# Patient Record
Sex: MALE | Race: WHITE | NOT HISPANIC OR LATINO | Employment: UNEMPLOYED | URBAN - METROPOLITAN AREA
[De-identification: names, ages, dates, MRNs, and addresses within clinical notes are randomized per-mention and may not be internally consistent; named-entity substitution may affect disease eponyms.]

---

## 2023-03-28 DIAGNOSIS — M25.512 ACUTE PAIN OF LEFT SHOULDER: Primary | ICD-10-CM

## 2023-03-30 ENCOUNTER — TELEPHONE (OUTPATIENT)
Dept: ORTHOPEDICS | Facility: CLINIC | Age: 38
End: 2023-03-30
Payer: MEDICAID

## 2023-03-30 ENCOUNTER — OFFICE VISIT (OUTPATIENT)
Dept: ORTHOPEDICS | Facility: CLINIC | Age: 38
End: 2023-03-30
Payer: MEDICAID

## 2023-03-30 ENCOUNTER — HOSPITAL ENCOUNTER (OUTPATIENT)
Dept: RADIOLOGY | Facility: HOSPITAL | Age: 38
Discharge: HOME | End: 2023-03-30
Attending: ORTHOPAEDIC SURGERY
Payer: MEDICAID

## 2023-03-30 VITALS — WEIGHT: 202 LBS | HEIGHT: 70 IN | BODY MASS INDEX: 28.92 KG/M2

## 2023-03-30 DIAGNOSIS — M25.512 ACUTE PAIN OF LEFT SHOULDER: ICD-10-CM

## 2023-03-30 DIAGNOSIS — G54.0 THORACIC OUTLET SYNDROME: Primary | ICD-10-CM

## 2023-03-30 PROCEDURE — 1159F PR MEDICATION LIST DOCUMENTED IN MEDICAL RECORD: ICD-10-PCS | Mod: CPTII,,, | Performed by: ORTHOPAEDIC SURGERY

## 2023-03-30 PROCEDURE — 73030 XR SHOULDER COMPLETE 2 OR MORE VIEWS LEFT: ICD-10-PCS | Mod: 26,LT,, | Performed by: RADIOLOGY

## 2023-03-30 PROCEDURE — 99203 OFFICE O/P NEW LOW 30 MIN: CPT | Mod: S$PBB,,, | Performed by: ORTHOPAEDIC SURGERY

## 2023-03-30 PROCEDURE — 73030 X-RAY EXAM OF SHOULDER: CPT | Mod: 26,LT,, | Performed by: RADIOLOGY

## 2023-03-30 PROCEDURE — 73030 X-RAY EXAM OF SHOULDER: CPT | Mod: TC,PN,LT

## 2023-03-30 PROCEDURE — 99999 PR PBB SHADOW E&M-EST. PATIENT-LVL III: ICD-10-PCS | Mod: PBBFAC,,, | Performed by: ORTHOPAEDIC SURGERY

## 2023-03-30 PROCEDURE — 1159F MED LIST DOCD IN RCRD: CPT | Mod: CPTII,,, | Performed by: ORTHOPAEDIC SURGERY

## 2023-03-30 PROCEDURE — 3008F PR BODY MASS INDEX (BMI) DOCUMENTED: ICD-10-PCS | Mod: CPTII,,, | Performed by: ORTHOPAEDIC SURGERY

## 2023-03-30 PROCEDURE — 99203 PR OFFICE/OUTPT VISIT, NEW, LEVL III, 30-44 MIN: ICD-10-PCS | Mod: S$PBB,,, | Performed by: ORTHOPAEDIC SURGERY

## 2023-03-30 PROCEDURE — 99999 PR PBB SHADOW E&M-EST. PATIENT-LVL III: CPT | Mod: PBBFAC,,, | Performed by: ORTHOPAEDIC SURGERY

## 2023-03-30 PROCEDURE — 3008F BODY MASS INDEX DOCD: CPT | Mod: CPTII,,, | Performed by: ORTHOPAEDIC SURGERY

## 2023-03-30 PROCEDURE — 99213 OFFICE O/P EST LOW 20 MIN: CPT | Mod: PBBFAC,PN | Performed by: ORTHOPAEDIC SURGERY

## 2023-03-30 RX ORDER — NABUMETONE 500 MG/1
500 TABLET, FILM COATED ORAL 2 TIMES DAILY WITH MEALS
Qty: 60 TABLET | Refills: 1 | Status: SHIPPED | OUTPATIENT
Start: 2023-03-30 | End: 2023-05-18

## 2023-03-30 NOTE — TELEPHONE ENCOUNTER
----- Message from Emma Sandhu sent at 3/30/2023  3:00 PM CDT -----  Contact: pt  Type:  running late     Who Called: pt   Would the patient rather a call back or a response via [facility]? call  Best Call Back Number: [phone]  Additional Information:   Pt stated they will be 5 mins late  Advise pt a notation alert about appt that it needs to be reschedule   Pt stated it was confirmed and did not know anything about it

## 2023-03-30 NOTE — PROGRESS NOTES
"Subjective:      Patient ID: Román Villagran is a 37 y.o. male.    Chief Complaint: Pain of the Left Shoulder      HPI  Román Villagran is a  37 y.o. male presenting today for shoulder and arm pain.  There was not a history of trauma.  Onset of symptoms began more than a year ago seems to been brought on by doing some weightlifting he does do weights in the gym  He has noticed some numbness tingling which radiates down the left arm occasionally associated with shoulder pain   Does not really describes neck pain although he has had neck problems in the past   The numbness is intermittent and seems to involve the entire arm even below the elbow area   No numbness or tingling in the right arm.      Review of patient's allergies indicates:  No Known Allergies      Current Outpatient Medications   Medication Sig Dispense Refill    nabumetone (RELAFEN) 500 MG tablet Take 1 tablet (500 mg total) by mouth 2 (two) times daily with meals. 60 tablet 1     No current facility-administered medications for this visit.       No past medical history on file.    No past surgical history on file.    Review of Systems:  ROS    OBJECTIVE:     PHYSICAL EXAM:  Height: 5' 10" (177.8 cm) Weight: 91.6 kg (202 lb)  Vitals:    03/30/23 1534   Weight: 91.6 kg (202 lb)   Height: 5' 10" (1.778 m)   PainSc:   6   PainLoc: Shoulder     Well developed, well nourished male in no acute distress  Alert and oriented x 3  HEENT- Normal exam  Lungs- Clear to auscultation  Heart- Regular rate and rhythm  Abdomen- Soft nontender  Extremity exam- examination of the left shoulder he is muscular there was no specific areas of tenderness no atrophy is noted range of motion left shoulder is full  There is no instability he does have some pain with abduction external rotation of the shoulder  Impingement sign negative   Strength intact   Tinel sign negative at the elbow and wrist    RADIOGRAPHS:  AP lateral x-ray left shoulder show no significant " abnormalities  Comments: I have personally reviewed the imaging and I agree with the above radiologist's report.    ASSESSMENT/PLAN:     IMPRESSION:  Possible thoracic outlet syndrome left shoulder and neck    PLAN:  I suspect he is having some intermittent nerve compression affecting the left arm  It could be coming from the cervical spine or near the thoracic outlet area  I recommended  some physical therapy to work on strengthening of the shoulder girdle and neck area   I would like him to avoid any aggravating activities  Started on Relafen 500 mg b.i.d. with food   Follow-up 4-6 weeks if symptoms do not improve we may consider MRI of the cervical spine or brachial plexus area       - We talked at length about the anatomy and pathophysiology of   Encounter Diagnosis   Name Primary?    Thoracic outlet syndrome Yes           Disclaimer: This note has been generated using voice-recognition software. There may be typographical errors that have been missed during proof-reading.

## 2023-04-03 ENCOUNTER — CLINICAL SUPPORT (OUTPATIENT)
Dept: REHABILITATION | Facility: HOSPITAL | Age: 38
End: 2023-04-03
Payer: MEDICAID

## 2023-04-03 DIAGNOSIS — G54.0 THORACIC OUTLET SYNDROME: ICD-10-CM

## 2023-04-03 DIAGNOSIS — R29.898 WEAKNESS OF SHOULDER: ICD-10-CM

## 2023-04-03 DIAGNOSIS — M25.612 DECREASED RANGE OF MOTION OF LEFT SHOULDER: ICD-10-CM

## 2023-04-03 PROCEDURE — 97161 PT EVAL LOW COMPLEX 20 MIN: CPT

## 2023-04-03 PROCEDURE — 97110 THERAPEUTIC EXERCISES: CPT

## 2023-04-06 NOTE — PLAN OF CARE
[facility]   Physical Therapy Initial Evaluation     Date: 4/3/2023   Name: Román Villagran  [facility] Number: [id number]    Therapy Diagnosis:   Encounter Diagnoses   Name Primary?    Thoracic outlet syndrome     Decreased range of motion of left shoulder     Weakness of shoulder      Physician: Gareth Mercado Jr., *    Physician Orders: PT Eval and Treat   Medical Diagnosis from Referral: G54.0 (ICD-10-CM) - Thoracic outlet syndrome  Evaluation Date: 4/3/2023  Authorization Period Expiration: 3/29/2024  Plan of Care Expiration: 7/3/2023  Progress Note Due: 5/3/2023  Visit # / Visits authorized: 1/ 1   FOTO: 1/ 3     Precautions: Standard    Time In: 11:05 AM  Time Out: 11:45 AM  Total Appointment Time (timed & untimed codes): 40 minutes    SUBJECTIVE   Date of onset: 2 years ago    History of current condition - Román reports insidious onset of Left shoulder pain that began about 2 years ago. He describes shoulder pain as sharp. He reports occasional tingling into Left UE. Aggravating factors include reaching and lifting heavy objects. He is a  and states the pain is worst at the end of his work day. He occasionally plays basketball, but hasn't been able to do strength training with weights since the pain started 2 years ago.        Falls: no    Imaging: X-ray Left shoulder  FINDINGS:  Left glenohumeral and AC joint articulations appear intact without acute fracture, dislocation, or osseous destruction.    Prior Therapy: no  Occupation:   Prior Level of Function: Independent   Current Level of Function: difficulty reaching, lifting heavy objects    Pain:  Current 0/10, worst 4/10, best 0/10   Location: Left shoulder   Description: sharp  Aggravating Factors: reaching, lifting  Easing Factors: rest    Patients goals: decrease pain, return to working out, job duties without pain       Medical History:   No past medical history on file.    Surgical History:   Román  "Sparkle  has no past surgical history on file.    Medications:   Román has a current medication list which includes the following prescription(s): nabumetone.    Allergies:   Review of patient's allergies indicates:  No Known Allergies     OBJECTIVE     Posture: slouched, rounded shoulders    Cervical Range of Motion:    % Limitation   Flexion WNL   Extension WNL   Right Rotation Minimal loss   Left Rotation WNL   Right Side Bending Minimal loss   Left Side Bending Minimal loss      Active Range of Motion (Passive Range of Motion) : Measured in degrees  Shoulder Left Right Goal   Flexion 107 (WNL)    Abduction 88 (WNL)    Functional ER AROM (ER at 90 abd) T3 (WNL) T5  90   Functional IR (IR at 90 abd) buttock (WNL) T12 80     Upper Extremity Strength  Elbow Left Right Goals   Biceps 4+/5, mild pain 5/5 5/5   Triceps 5/5 5/5 5/5     Shoulder Left Right Goals   Shoulder flexion: 4/5 5/5 5/5   Shoulder Abduction: 4-/5, pain 5/5 5/5   Shoulder ER 4-/5, pain 5/5 5/5   Shoulder IR 4/5 5/5 5/5     Special Tests:     Impingement Left   Empty Can Test Positive   Huerta Callum Positive   Painful ER MMT Positive     Joint Mobility: WNL    Palpation: no TTP noted      Limitation/Restriction for FOTO Shoulder Survey    Therapist reviewed FOTO scores for Román Villagran on 4/3/2023.   FOTO documents entered into [employer] - see Media section.    Initial Score: 52%         TREATMENT     Total Treatment time (time-based codes) separate from Evaluation: 15 minutes      Román received the treatments listed below:      MANUAL THERAPY TECHNIQUES including  were applied to  for 0 minutes.  Not performed       THERAPEUTIC EXERCISES to develop  strength, ROM, and posture for 15 minutes including:    Doorway pec stretch 2x30"  Wall slides x10  Standing scaption AAROM c/ dowel x10  Sidelying ER 0# 2x10  TB B ER GTB 2x10      PATIENT EDUCATION AND HOME EXERCISES   Home Exercises and Patient Education Provided    Education " provided:   Role of PT, PT POC  Cryotherapy for symptom management  HEP    Written Home Exercises Provided: yes.  Exercises were reviewed and Román was able to demonstrate them prior to the end of the session.  Román demonstrated good  understanding of the education provided.     See EMR under Patient Instructions for exercises provided 4/3/2023    ASSESSMENT   Román is a 37 y.o. male referred to outpatient Physical Therapy with a medical diagnosis of G54.0 (ICD-10-CM) - Thoracic outlet syndrome, with complaints of Left shoulder pain that began about 2 years ago and decreased Left UE function. Upon physical assessment, patient demonstrates decreased Left shoulder range of motion, Left shoulder weakness, and poor posture. Patient's impairments limit his ability to reach overhead and behind back, lift heavy objects, perform job duties as , and participate in working out. Patient prognosis is Good. Patient will benefit from skilled outpatient Physical Therapy to address the deficits stated above and in the chart below, provide patient education, and to maximize patient's level of independence and quality of life.     Plan of care discussed with patient: Yes  Patient's spiritual, cultural and educational needs considered and patient is agreeable to the plan of care and goals as stated below:     Anticipated Barriers for therapy: none    Medical Necessity is demonstrated by the following  History  Co-morbidities and personal factors that may impact the plan of care Co-morbidities:       Personal Factors:   no deficits     low   Examination  Body Structures and Functions, activity limitations and participation restrictions that may impact the plan of care Body Regions:   upper extremities    Body Systems:    ROM  strength  motor control  posture    Participation Restrictions:   Lifting, working out    Activity limitations:   Learning and applying knowledge  no deficits    General Tasks and Commands  no  deficits    Communication  no deficits    Mobility  lifting and carrying objects    Self care  no deficits    Domestic Life  no deficits    Interactions/Relationships  no deficits    Life Areas  no deficits    Community and Social Life  no deficits         low   Clinical Presentation stable and uncomplicated low   Decision Making/ Complexity Score: low       Goals:    SHORT TERM GOALS:  4 weeks 4/3/2023   Recent signs and systems trend is improving in order to progress towards LTG's.    Patient will be independent with HEP in order to further progress and return to maximal function.    Pain rating at Worst: 3/10 in order to progress towards increased independence with activity.    Patient will be able to correct postural deviations in sitting and standing, to decrease pain and promote postural awareness for injury prevention.       LONG TERM GOALS: 8 weeks 4/3/2023   Patient will return to normal ADL, recreational, and work related activities with less pain and limitation.     Patient will improve AROM to stated goals in order to return to maximal functional potential.     Patient will improve Strength to stated goals of appropriate musculature in order to improve functional independence.     Pain Rating at Worst: 1/10 to improve Quality of Life.     Patient will meet predicted functional outcome (FOTO) score: 67% or greater to increase self-worth & perceived functional ability.    Patient will have met personal goal of: returning to strength training.        PLAN   Plan of care Certification: 4/3/2023 to 7/3/2023.    Outpatient Physical Therapy 1-2 times weekly for 6-8 weeks to include the following interventions: Manual Therapy, Moist Heat/ Ice, Neuromuscular Re-ed, Patient Education, Therapeutic Activities, Therapeutic Exercise, and Modalities.     Milana Meredith, PT      I CERTIFY THE NEED FOR THESE SERVICES FURNISHED UNDER THIS PLAN OF TREATMENT AND WHILE UNDER MY CARE   Physician's comments:     Physician's  Signature: ___________________________________________________

## 2023-04-26 ENCOUNTER — CLINICAL SUPPORT (OUTPATIENT)
Dept: REHABILITATION | Facility: HOSPITAL | Age: 38
End: 2023-04-26
Payer: MEDICAID

## 2023-04-26 DIAGNOSIS — R29.898 WEAKNESS OF SHOULDER: ICD-10-CM

## 2023-04-26 DIAGNOSIS — M25.612 DECREASED RANGE OF MOTION OF LEFT SHOULDER: Primary | ICD-10-CM

## 2023-04-26 PROCEDURE — 97110 THERAPEUTIC EXERCISES: CPT

## 2023-04-26 NOTE — PROGRESS NOTES
"[facility]   Physical Therapy Treatment Note     [id number]    Therapy Diagnosis:   Encounter Diagnoses   Name Primary?    Decreased range of motion of left shoulder Yes    Weakness of shoulder      Physician: Gareth Mercado Jr., *    Visit Date: 4/26/2023    Physician Orders: PT Eval and Treat   Medical Diagnosis from Referral: G54.0 (ICD-10-CM) - Thoracic outlet syndrome  Evaluation Date: 4/3/2023  Authorization Period Expiration: 3/29/2024   Plan of Care Certification Period: 7/3/2023  Visit # / Visits authorized: 2/ 20      Progress Note Due: 5/3/2023  FOTO: 1/ 1  PTA Visit #: 0/5     Precautions: Standard    Time In: 11:00 AM  Time Out: 11:40 AM  Total Billable Time: 40 minutes    SUBJECTIVE   Pt reports: his shoulder has been feeling about the same since initial eval. He continues to have pain with heavy lifting. No pain at rest.    He was compliant with home exercise program.  Response to previous treatment: no adverse effects  Functional change: none yet    Pain: 0/10  Location: Left shoulder     OBJECTIVE     Objective Measures updated at progress report unless specified.       TREATMENT     Román received the treatments listed below:      received therapeutic exercises to develop strength and ROM for 40  minutes including:    UBE 2' forward / 2' backwards  Doorway pec stretch 3x30"  Wall slides in door way 3 sec hold 2x10  Scaption AAROM c/ dowel 2x10  Prone rows 5# DB 3x10  Prone extension 4# DB 3x10  B ER GTB 3x10  TB T's RTB 3x10    received the following manual therapy techniques:  for 0 minutes, including:       PATIENT EDUCATION AND HOME EXERCISES     Home Exercises Provided and Patient Education Provided     Education provided:   - cues with exercises     Written Home Exercises Provided: yes. Exercises were reviewed and Román was able to demonstrate them prior to the end of the session.  Román demonstrated good  understanding of " the education provided. See EMR under Patient Instructions for exercises provided during therapy sessions    ASSESSMENT   Pt tolerated treatment session well and completed therex without c/o shoulder pain, he reported mild muscular fatigue at end of session. He required verbal and tactile cueing for improved scap retraction with exercises. Encouraged pt to continue with HEP. Will progress treatment as tolerated.     Pt prognosis is Good.     Pt will continue to benefit from skilled outpatient physical therapy to address the deficits listed in the problem list box on initial evaluation, provide pt/family education and to maximize pt's level of independence in the home and community environment.     Pt's spiritual, cultural and educational needs considered and pt agreeable to plan of care and goals.     Anticipated barriers to physical therapy: none     Goals:     SHORT TERM GOALS:  4 weeks 4/3/2023   Recent signs and systems trend is improving in order to progress towards LTG's.     Patient will be independent with HEP in order to further progress and return to maximal function.     Pain rating at Worst: 3/10 in order to progress towards increased independence with activity.     Patient will be able to correct postural deviations in sitting and standing, to decrease pain and promote postural awareness for injury prevention.         LONG TERM GOALS: 8 weeks 4/3/2023   Patient will return to normal ADL, recreational, and work related activities with less pain and limitation.      Patient will improve AROM to stated goals in order to return to maximal functional potential.      Patient will improve Strength to stated goals of appropriate musculature in order to improve functional independence.      Pain Rating at Worst: 1/10 to improve Quality of Life.      Patient will meet predicted functional outcome (FOTO) score: 67% or greater to increase self-worth & perceived functional ability.     Patient will have met personal  goal of: returning to strength training.        PLAN   Continue per established plan of care towards established goals.    Milana Meredith, PT

## 2023-05-01 ENCOUNTER — CLINICAL SUPPORT (OUTPATIENT)
Dept: REHABILITATION | Facility: HOSPITAL | Age: 38
End: 2023-05-01
Payer: MEDICAID

## 2023-05-01 DIAGNOSIS — M25.612 DECREASED RANGE OF MOTION OF LEFT SHOULDER: Primary | ICD-10-CM

## 2023-05-01 DIAGNOSIS — R29.898 WEAKNESS OF SHOULDER: ICD-10-CM

## 2023-05-01 PROCEDURE — 97110 THERAPEUTIC EXERCISES: CPT

## 2023-05-01 NOTE — PROGRESS NOTES
"[facility]   Physical Therapy Treatment Note     Name: Román Morell  Clinic Number: [id number]    Therapy Diagnosis:   Encounter Diagnoses   Name Primary?    Decreased range of motion of left shoulder Yes    Weakness of shoulder      Physician: Gareth Mercado Jr., *    Visit Date: 5/1/2023    Physician Orders: PT Eval and Treat   Medical Diagnosis from Referral: G54.0 (ICD-10-CM) - Thoracic outlet syndrome  Evaluation Date: 4/3/2023  Authorization Period Expiration: 3/29/2024   Plan of Care Certification Period: 7/3/2023  Visit # / Visits authorized: 3/ 20      Progress Note Due: 5/3/2023  FOTO: 1/ 1  PTA Visit #: 0/5     Precautions: Standard    Time In: 11:00 AM  Time Out: 11:40 AM  Total Billable Time: 40 minutes    SUBJECTIVE   Pt reports: no pain currently. Still feels pain with heavy lifting.     He was compliant with home exercise program.  Response to previous treatment: no adverse effects  Functional change: none yet    Pain: 0/10  Location: Left shoulder     OBJECTIVE     Objective Measures updated at progress report unless specified.       TREATMENT     Román received the treatments listed below:      received therapeutic exercises to develop strength and ROM for 40  minutes including:    UBE 3' forward / 3' backwards  Doorway pec stretch 3x30"  Serratus wall slides 2x10   Scaption AAROM c/ dowel 2x10  Sidelying ER 2x10  Prone rows 6# DB 3x10  Prone extension 4# DB 3x10  B ER GTB 3x10  TB T's RTB 3x10    received the following manual therapy techniques:  for 0 minutes, including:       PATIENT EDUCATION AND HOME EXERCISES     Home Exercises Provided and Patient Education Provided     Education provided:   - cues with exercises     Written Home Exercises Provided: yes. Exercises were reviewed and Román was able to demonstrate them prior to the end of the session.  Román demonstrated good  understanding of the education provided. See EMR under Patient Instructions for " exercises provided during therapy sessions    ASSESSMENT   Pt tolerated treatment session well and completed therex without c/o shoulder pain, he reported mild muscular fatigue at end of session.  Encouraged pt to continue with HEP. Will progress treatment as tolerated.     Pt prognosis is Good.     Pt will continue to benefit from skilled outpatient physical therapy to address the deficits listed in the problem list box on initial evaluation, provide pt/family education and to maximize pt's level of independence in the home and community environment.     Pt's spiritual, cultural and educational needs considered and pt agreeable to plan of care and goals.     Anticipated barriers to physical therapy: none     Goals:     SHORT TERM GOALS:  4 weeks 4/3/2023   Recent signs and systems trend is improving in order to progress towards LTG's.     Patient will be independent with HEP in order to further progress and return to maximal function.     Pain rating at Worst: 3/10 in order to progress towards increased independence with activity.     Patient will be able to correct postural deviations in sitting and standing, to decrease pain and promote postural awareness for injury prevention.         LONG TERM GOALS: 8 weeks 4/3/2023   Patient will return to normal ADL, recreational, and work related activities with less pain and limitation.      Patient will improve AROM to stated goals in order to return to maximal functional potential.      Patient will improve Strength to stated goals of appropriate musculature in order to improve functional independence.      Pain Rating at Worst: 1/10 to improve Quality of Life.      Patient will meet predicted functional outcome (FOTO) score: 67% or greater to increase self-worth & perceived functional ability.     Patient will have met personal goal of: returning to strength training.        PLAN   Continue per established plan of care towards established goals.    Milana Meredith, PT

## 2023-05-08 ENCOUNTER — CLINICAL SUPPORT (OUTPATIENT)
Dept: REHABILITATION | Facility: HOSPITAL | Age: 38
End: 2023-05-08
Payer: MEDICAID

## 2023-05-08 DIAGNOSIS — R29.898 WEAKNESS OF SHOULDER: ICD-10-CM

## 2023-05-08 DIAGNOSIS — M25.612 DECREASED RANGE OF MOTION OF LEFT SHOULDER: Primary | ICD-10-CM

## 2023-05-08 PROCEDURE — 97110 THERAPEUTIC EXERCISES: CPT | Mod: CQ

## 2023-05-08 NOTE — PROGRESS NOTES
"[facility]   Physical Therapy Treatment Note     Name: Román Morell  Clinic Number: [id number]    Therapy Diagnosis:   Encounter Diagnoses   Name Primary?    Decreased range of motion of left shoulder Yes    Weakness of shoulder      Physician: Gareth Mercado Jr., *    Visit Date: 5/8/2023    Physician Orders: PT Eval and Treat   Medical Diagnosis from Referral: G54.0 (ICD-10-CM) - Thoracic outlet syndrome  Evaluation Date: 4/3/2023  Authorization Period Expiration: 3/29/2024   Plan of Care Certification Period: 7/3/2023  Visit # / Visits authorized: 3/ 20 + eval     Progress Note Due: 5/3/2023  FOTO: 1/ 1  PTA Visit #: 1/5     Precautions: Standard    Time In: 11:00 AM  Time Out: 11:40 AM  Total Billable Time: 40 minutes    SUBJECTIVE   Pt reports: no pain currently but continues to experience pain with heavy lifting.     He was compliant with home exercise program.  Response to previous treatment: no adverse effects  Functional change: none yet    Pain: 0/10  Location: Left shoulder     OBJECTIVE     Objective Measures updated at progress report unless specified.     TREATMENT     Román received the treatments listed below:      received therapeutic exercises to develop strength and ROM for 40 minutes including:    UBE 3' forward / 3' backwards  Doorway pec stretch 3x30"  Serratus wall slides 2x10   Scaption AAROM c/ dowel 3x10  Sidelying ER 3x10  Prone rows 6# DB 3x10  Prone extension 4# DB 3x10  B ER GTB 3x10  TB T's RTB 3x10    received the following manual therapy techniques:  for 0 minutes, including:     PATIENT EDUCATION AND HOME EXERCISES     Home Exercises Provided and Patient Education Provided     Education provided:   - cues with exercises     Written Home Exercises Provided: yes. Exercises were reviewed and Román was able to demonstrate them prior to the end of the session.  Román demonstrated good  understanding of the education provided. See EMR under Patient " Instructions for exercises provided during therapy sessions    ASSESSMENT   Patient completed his therapy and had no difficulty with today's progressions, noted in bold, with no increase in pain prior to leaving the clinic, although he reported mild muscular fatigue at end of session.  Encouraged pt to continue with HEP. Will progress treatment as tolerated.     Pt prognosis is Good.     Pt will continue to benefit from skilled outpatient physical therapy to address the deficits listed in the problem list box on initial evaluation, provide pt/family education and to maximize pt's level of independence in the home and community environment.     Pt's spiritual, cultural and educational needs considered and pt agreeable to plan of care and goals.     Anticipated barriers to physical therapy: none     Goals:     SHORT TERM GOALS:  4 weeks 4/3/2023   Recent signs and systems trend is improving in order to progress towards LTG's.     Patient will be independent with HEP in order to further progress and return to maximal function.     Pain rating at Worst: 3/10 in order to progress towards increased independence with activity.     Patient will be able to correct postural deviations in sitting and standing, to decrease pain and promote postural awareness for injury prevention.         LONG TERM GOALS: 8 weeks 4/3/2023   Patient will return to normal ADL, recreational, and work related activities with less pain and limitation.      Patient will improve AROM to stated goals in order to return to maximal functional potential.      Patient will improve Strength to stated goals of appropriate musculature in order to improve functional independence.      Pain Rating at Worst: 1/10 to improve Quality of Life.      Patient will meet predicted functional outcome (FOTO) score: 67% or greater to increase self-worth & perceived functional ability.     Patient will have met personal goal of: returning to strength training.        PLAN    Continue per established plan of care towards established goals.    Christiano Acosta, PTA

## 2023-05-15 ENCOUNTER — OFFICE VISIT (OUTPATIENT)
Dept: ORTHOPEDICS | Facility: CLINIC | Age: 38
End: 2023-05-15
Payer: MEDICAID

## 2023-05-15 ENCOUNTER — CLINICAL SUPPORT (OUTPATIENT)
Dept: REHABILITATION | Facility: HOSPITAL | Age: 38
End: 2023-05-15
Payer: MEDICAID

## 2023-05-15 VITALS — WEIGHT: 202 LBS | HEIGHT: 70 IN | BODY MASS INDEX: 28.92 KG/M2

## 2023-05-15 DIAGNOSIS — G89.29 CHRONIC LEFT SHOULDER PAIN: Primary | ICD-10-CM

## 2023-05-15 DIAGNOSIS — M25.612 DECREASED RANGE OF MOTION OF LEFT SHOULDER: ICD-10-CM

## 2023-05-15 DIAGNOSIS — M25.512 CHRONIC LEFT SHOULDER PAIN: Primary | ICD-10-CM

## 2023-05-15 DIAGNOSIS — M25.612 DECREASED RANGE OF MOTION OF LEFT SHOULDER: Primary | ICD-10-CM

## 2023-05-15 DIAGNOSIS — R29.898 WEAKNESS OF SHOULDER: ICD-10-CM

## 2023-05-15 PROBLEM — G54.0 THORACIC OUTLET SYNDROME: Status: RESOLVED | Noted: 2023-03-30 | Resolved: 2023-05-15

## 2023-05-15 PROCEDURE — 99213 OFFICE O/P EST LOW 20 MIN: CPT | Mod: S$PBB,,, | Performed by: ORTHOPAEDIC SURGERY

## 2023-05-15 PROCEDURE — 3008F PR BODY MASS INDEX (BMI) DOCUMENTED: ICD-10-PCS | Mod: CPTII,,, | Performed by: ORTHOPAEDIC SURGERY

## 2023-05-15 PROCEDURE — 3008F BODY MASS INDEX DOCD: CPT | Mod: CPTII,,, | Performed by: ORTHOPAEDIC SURGERY

## 2023-05-15 PROCEDURE — 99999 PR PBB SHADOW E&M-EST. PATIENT-LVL II: CPT | Mod: PBBFAC,,, | Performed by: ORTHOPAEDIC SURGERY

## 2023-05-15 PROCEDURE — 99213 PR OFFICE/OUTPT VISIT, EST, LEVL III, 20-29 MIN: ICD-10-PCS | Mod: S$PBB,,, | Performed by: ORTHOPAEDIC SURGERY

## 2023-05-15 PROCEDURE — 99212 OFFICE O/P EST SF 10 MIN: CPT | Mod: PBBFAC,PN | Performed by: ORTHOPAEDIC SURGERY

## 2023-05-15 PROCEDURE — 99999 PR PBB SHADOW E&M-EST. PATIENT-LVL II: ICD-10-PCS | Mod: PBBFAC,,, | Performed by: ORTHOPAEDIC SURGERY

## 2023-05-15 PROCEDURE — 97110 THERAPEUTIC EXERCISES: CPT | Mod: CQ

## 2023-05-15 PROCEDURE — 1159F PR MEDICATION LIST DOCUMENTED IN MEDICAL RECORD: ICD-10-PCS | Mod: CPTII,,, | Performed by: ORTHOPAEDIC SURGERY

## 2023-05-15 PROCEDURE — 1159F MED LIST DOCD IN RCRD: CPT | Mod: CPTII,,, | Performed by: ORTHOPAEDIC SURGERY

## 2023-05-15 NOTE — PROGRESS NOTES
"[facility]   Physical Therapy Treatment Note     [id number]    Therapy Diagnosis:   Encounter Diagnoses   Name Primary?    Decreased range of motion of left shoulder Yes    Weakness of shoulder      Physician: Gareth Mercado Jr., *    Visit Date: 5/15/2023    Physician Orders: PT Eval and Treat   Medical Diagnosis from Referral: G54.0 (ICD-10-CM) - Thoracic outlet syndrome  Evaluation Date: 4/3/2023  Authorization Period Expiration: 3/29/2024   Plan of Care Certification Period: 7/3/2023  Visit # / Visits authorized: 4/ 20 + eval     Progress Note Due: 5/3/2023  FOTO: 1/ 1  PTA Visit #: 2/5     Precautions: Standard    Time In: 10:45 AM  Time Out: 11:30 AM  Total Billable Time: 45 minutes    SUBJECTIVE   Pt reports: having no pain. It only bothers his when he lifts anything heavy.    He was compliant with home exercise program.  Response to previous treatment: no adverse effects  Functional change: none yet    Pain: 0/10  Location: Left shoulder     OBJECTIVE     Objective Measures updated at progress report unless specified.     TREATMENT     Román received the treatments listed below:      received therapeutic exercises to develop strength and ROM for 45 minutes including:    UBE 3' forward / 3' backwards  Doorway pec stretch 3x30"  Serratus wall slides 2x10   Scaption AAROM c/ dowel 3x10  Sidelying ER 3x10  Prone rows 6# DB 3x10  Prone extension 4# DB 3x10  B ER GTB 3x10  TB T's RTB 3x10     received the following manual therapy techniques:  for 0 minutes, including:     PATIENT EDUCATION AND HOME EXERCISES     Home Exercises Provided and Patient Education Provided     Education provided:   - cues with exercises     Written Home Exercises Provided: yes. Exercises were reviewed and Román was able to demonstrate them prior to the end of the session.  Román demonstrated good  understanding of the education provided. See EMR under Patient " Instructions for exercises provided during therapy sessions    ASSESSMENT   Patient requires a short rest break, less than 30 seconds, between sets when performing external rotation exercises due to fatigue. Patient completed his therapy with no increase in pain prior to leaving the clinic.  Encouraged pt to continue with HEP. Will progress treatment as tolerated.     Pt prognosis is Good.     Pt will continue to benefit from skilled outpatient physical therapy to address the deficits listed in the problem list box on initial evaluation, provide pt/family education and to maximize pt's level of independence in the home and community environment.     Pt's spiritual, cultural and educational needs considered and pt agreeable to plan of care and goals.     Anticipated barriers to physical therapy: none     Goals:     SHORT TERM GOALS:  4 weeks 4/3/2023   Recent signs and systems trend is improving in order to progress towards LTG's.     Patient will be independent with HEP in order to further progress and return to maximal function.     Pain rating at Worst: 3/10 in order to progress towards increased independence with activity.     Patient will be able to correct postural deviations in sitting and standing, to decrease pain and promote postural awareness for injury prevention.         LONG TERM GOALS: 8 weeks 4/3/2023   Patient will return to normal ADL, recreational, and work related activities with less pain and limitation.      Patient will improve AROM to stated goals in order to return to maximal functional potential.      Patient will improve Strength to stated goals of appropriate musculature in order to improve functional independence.      Pain Rating at Worst: 1/10 to improve Quality of Life.      Patient will meet predicted functional outcome (FOTO) score: 67% or greater to increase self-worth & perceived functional ability.     Patient will have met personal goal of: returning to strength training.         PLAN   Continue per established plan of care towards established goals.    Christiano Acosta, PTA

## 2023-05-15 NOTE — PROGRESS NOTES
"Subjective:      Patient ID: Román Villagran is a 37 y.o. male.  Chief Complaint: Follow-up of the Left Shoulder      HPI  Román Villagran is a  37 y.o. male presenting today for follow up of arm pain.  He reports that he is now having more pain centered in the left shoulder previously he would having numbness in left arm now it seems more shoulder pain related and is worse with overhead activities particularly when working out in the gym.    Review of patient's allergies indicates:  No Known Allergies      Current Outpatient Medications   Medication Sig Dispense Refill    nabumetone (RELAFEN) 500 MG tablet Take 1 tablet (500 mg total) by mouth 2 (two) times daily with meals. 60 tablet 1     No current facility-administered medications for this visit.       No past medical history on file.    No past surgical history on file.    OBJECTIVE:   PHYSICAL EXAM:  Height: 5' 10" (177.8 cm) Weight: 91.6 kg (202 lb)  Vitals:    05/15/23 1408   Weight: 91.6 kg (202 lb)   Height: 5' 10" (1.778 m)   PainSc: 0-No pain     Ortho/SPM Exam  Examination left shoulder no tenderness no swelling  Range of motion slightly decreased   Positive impingement sign  No instability rotator cuff strength intact    RADIOGRAPHS:  None  Comments: I have personally reviewed the imaging and I agree with the above radiologist's report.    ASSESSMENT/PLAN:     IMPRESSION:  1. Left shoulder pain chronic.      2. 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 There may be typographical errors that have been missed during proof-reading.     " Multip at 38.6 weeks gestation admitted to room 215 from triage for active labor. Monitors applied with patient consent. Plan of care reviewed. IV started with assistance from additional RN. Penicillin 5 mu IV initiated for positive Group B Strep. Patient breathing through contractions with assistance of RN, spouse and friend at bedside.    Report to PARMINDER Mccarthy. Dr. Baker in department.

## 2023-05-18 ENCOUNTER — CLINICAL SUPPORT (OUTPATIENT)
Dept: REHABILITATION | Facility: HOSPITAL | Age: 38
End: 2023-05-18
Payer: MEDICAID

## 2023-05-18 ENCOUNTER — OFFICE VISIT (OUTPATIENT)
Dept: OPHTHALMOLOGY | Facility: CLINIC | Age: 38
End: 2023-05-18
Payer: MEDICAID

## 2023-05-18 ENCOUNTER — TELEPHONE (OUTPATIENT)
Dept: OPHTHALMOLOGY | Facility: CLINIC | Age: 38
End: 2023-05-18
Payer: MEDICAID

## 2023-05-18 DIAGNOSIS — R29.898 WEAKNESS OF SHOULDER: ICD-10-CM

## 2023-05-18 DIAGNOSIS — H10.32 ACUTE BACTERIAL CONJUNCTIVITIS OF LEFT EYE: Primary | ICD-10-CM

## 2023-05-18 DIAGNOSIS — M25.612 DECREASED RANGE OF MOTION OF LEFT SHOULDER: Primary | ICD-10-CM

## 2023-05-18 PROCEDURE — 99999 PR PBB SHADOW E&M-EST. PATIENT-LVL II: ICD-10-PCS | Mod: PBBFAC,,, | Performed by: OPHTHALMOLOGY

## 2023-05-18 PROCEDURE — 99212 OFFICE O/P EST SF 10 MIN: CPT | Mod: PBBFAC | Performed by: OPHTHALMOLOGY

## 2023-05-18 PROCEDURE — 97110 THERAPEUTIC EXERCISES: CPT

## 2023-05-18 PROCEDURE — 99202 OFFICE O/P NEW SF 15 MIN: CPT | Mod: S$PBB,,, | Performed by: OPHTHALMOLOGY

## 2023-05-18 PROCEDURE — 1159F MED LIST DOCD IN RCRD: CPT | Mod: CPTII,,, | Performed by: OPHTHALMOLOGY

## 2023-05-18 PROCEDURE — 1159F PR MEDICATION LIST DOCUMENTED IN MEDICAL RECORD: ICD-10-PCS | Mod: CPTII,,, | Performed by: OPHTHALMOLOGY

## 2023-05-18 PROCEDURE — 99202 PR OFFICE/OUTPT VISIT, NEW, LEVL II, 15-29 MIN: ICD-10-PCS | Mod: S$PBB,,, | Performed by: OPHTHALMOLOGY

## 2023-05-18 PROCEDURE — 99999 PR PBB SHADOW E&M-EST. PATIENT-LVL II: CPT | Mod: PBBFAC,,, | Performed by: OPHTHALMOLOGY

## 2023-05-18 RX ORDER — POLYMYXIN B SULFATE AND TRIMETHOPRIM 1; 10000 MG/ML; [USP'U]/ML
1 SOLUTION OPHTHALMIC 3 TIMES DAILY
Qty: 10 ML | Refills: 0 | Status: SHIPPED | OUTPATIENT
Start: 2023-05-18 | End: 2023-05-21

## 2023-05-18 NOTE — PATIENT INSTRUCTIONS
Assessment /Plan     For exam results, see Encounter Report.    Bacterial conjunctivitis of left eye    - Pt typically wears contacts to sleep at night. Noticed L eye irritation for past few days, this morning woke up with redness in L eye and some mucopurulent discharge  - On exam, no epi defect or ulcer  - Upper lid everted and fornices swept with q-tip, no retained material  - Presentation consistent with bacterial conjunctivitis secondary to retained contact lens overnight  - Start Polytrim TID OS  - Follow-as needed  
Never smoker

## 2023-05-18 NOTE — TELEPHONE ENCOUNTER
----- Message from Tesfaye Choi sent at 5/18/2023  7:47 AM CDT -----  Type:  Same Day Appointment Request    Caller is requesting a same day appointment.  Caller declined first available appointment listed below.    Name of Caller: Román  When is the first available appointment?   Symptoms: left eye pain  Best Call Back Number: [phone]  Additional Information:  no

## 2023-05-18 NOTE — PROGRESS NOTES
HPI    No eyedrops  No eye surgery     Pt states x 1 week he has been having FBS OS off and on, redness and   irritation.  Pt states this morning he had eye pain OS (2 on scale) and it   was hard to open.  Pt states over the last week VA OS doesn't seem as   clear as OD.  Pt states the light aggravates OS at night when driving. Pt   states it feels like he has crusting OS but there is nothing there. Pt   denies tearing OS.    Last edited by Pina Doty MA on 5/18/2023 11:13 AM.            Assessment /Plan     For exam results, see Encounter Report.    Bacterial conjunctivitis of left eye    - Pt typically wears contacts to sleep at night. Noticed L eye irritation for past few days, this morning woke up with redness in L eye and some mucopurulent discharge  - On exam, no epi defect or ulcer  - Upper lid everted and fornices swept with q-tip, no retained material  - Presentation consistent with bacterial conjunctivitis secondary to retained contact lens overnight  - Start Polytrim TID OS  - Follow-up as needed.

## 2023-05-18 NOTE — PROGRESS NOTES
"[facility]   Physical Therapy Treatment Note     [id number]    Therapy Diagnosis:   Encounter Diagnoses   Name Primary?    Decreased range of motion of left shoulder Yes    Weakness of shoulder        Physician: Gareth Mercado Jr., *    Visit Date: 5/18/2023    Physician Orders: PT Eval and Treat   Medical Diagnosis from Referral: G54.0 (ICD-10-CM) - Thoracic outlet syndrome  Evaluation Date: 4/3/2023  Authorization Period Expiration: 3/29/2024   Plan of Care Certification Period: 7/3/2023  Visit # / Visits authorized: 4/ 20 + eval     Progress Note Due: 5/3/2023  FOTO: 1/ 1  PTA Visit #: 2/5     Precautions: Standard    Time In: 12:15AM  Time Out: 1:00AM  Total Billable Time: 45 minutes    SUBJECTIVE   Pt reports: having no pain. It only bothers his when he lifts anything heavy.    He was compliant with home exercise program.  Response to previous treatment: no adverse effects  Functional change: none yet    Pain: 0/10  Location: Left shoulder     OBJECTIVE     Objective Measures updated at progress report unless specified.     TREATMENT     Román received the treatments listed below:      received therapeutic exercises to develop strength and ROM for 45 minutes including:    UBE 3' forward / 3' backwards  Doorway pec stretch 3x30"  Serratus wall slides 2x10   Scaption AAROM c/ dowel 3x10  Sidelying ER 3x10  Prone rows 6# DB 3x10  Prone extension 4# DB 3x10  B ER GTB 3x10  TB T's RTB 3x10     received the following manual therapy techniques:  for 0 minutes, including:     PATIENT EDUCATION AND HOME EXERCISES     Home Exercises Provided and Patient Education Provided     Education provided:   - cues with exercises     Written Home Exercises Provided: yes. Exercises were reviewed and Román was able to demonstrate them prior to the end of the session.  Román demonstrated good  understanding of the education provided. See EMR under Patient " Instructions for exercises provided during therapy sessions    ASSESSMENT   Patient requires a short rest break, less than 30 seconds, between sets when performing external rotation exercises due to fatigue. Patient completed his therapy with no increase in pain prior to leaving the clinic.  Encouraged pt to continue with HEP. Will progress treatment as tolerated.     Pt prognosis is Good.     Pt will continue to benefit from skilled outpatient physical therapy to address the deficits listed in the problem list box on initial evaluation, provide pt/family education and to maximize pt's level of independence in the home and community environment.     Pt's spiritual, cultural and educational needs considered and pt agreeable to plan of care and goals.     Anticipated barriers to physical therapy: none     Goals:     SHORT TERM GOALS:  4 weeks 4/3/2023   Recent signs and systems trend is improving in order to progress towards LTG's.     Patient will be independent with HEP in order to further progress and return to maximal function.     Pain rating at Worst: 3/10 in order to progress towards increased independence with activity.     Patient will be able to correct postural deviations in sitting and standing, to decrease pain and promote postural awareness for injury prevention.         LONG TERM GOALS: 8 weeks 4/3/2023   Patient will return to normal ADL, recreational, and work related activities with less pain and limitation.      Patient will improve AROM to stated goals in order to return to maximal functional potential.      Patient will improve Strength to stated goals of appropriate musculature in order to improve functional independence.      Pain Rating at Worst: 1/10 to improve Quality of Life.      Patient will meet predicted functional outcome (FOTO) score: 67% or greater to increase self-worth & perceived functional ability.     Patient will have met personal goal of: returning to strength training.         PLAN   Continue per established plan of care towards established goals.    Mateusz Tatum, PT

## 2023-05-22 ENCOUNTER — CLINICAL SUPPORT (OUTPATIENT)
Dept: REHABILITATION | Facility: HOSPITAL | Age: 38
End: 2023-05-22
Payer: MEDICAID

## 2023-05-22 DIAGNOSIS — R29.898 WEAKNESS OF SHOULDER: ICD-10-CM

## 2023-05-22 DIAGNOSIS — M25.612 DECREASED RANGE OF MOTION OF LEFT SHOULDER: Primary | ICD-10-CM

## 2023-05-22 PROCEDURE — 97110 THERAPEUTIC EXERCISES: CPT

## 2023-05-22 NOTE — PROGRESS NOTES
"[facility]   Physical Therapy Treatment Note     Name: Román Morell  Clinic Number: [id number]    Therapy Diagnosis:   Encounter Diagnoses   Name Primary?    Decreased range of motion of left shoulder Yes    Weakness of shoulder        Physician: Gareth Mercado Jr., *    Visit Date: 5/22/2023    Physician Orders: PT Eval and Treat   Medical Diagnosis from Referral: G54.0 (ICD-10-CM) - Thoracic outlet syndrome  Evaluation Date: 4/3/2023  Authorization Period Expiration: 3/29/2024   Plan of Care Certification Period: 7/3/2023  Visit # / Visits authorized: 4/ 20 + eval     Progress Note Due: 5/3/2023  FOTO: 1/ 1  PTA Visit #: 2/5     Precautions: Standard    Time In: 11:00AM  Time Out: 11:45 AM  Total Billable Time: 45 minutes    SUBJECTIVE   Pt reports: having no pain. It only bothers his when he lifts anything heavy.    He was compliant with home exercise program.  Response to previous treatment: no adverse effects  Functional change: none yet    Pain: 0/10  Location: Left shoulder     OBJECTIVE     Objective Measures updated at progress report unless specified.     TREATMENT     Román received the treatments listed below:      received therapeutic exercises to develop strength and ROM for 45 minutes including:    UBE 3' forward / 3' backwards  Doorway pec stretch 3x30"  Serratus wall slides 2x10   Scaption AAROM c/ dowel 3x10  Sidelying ER 3x10  Prone rows 6# DB 3x10  Prone extension 4# DB 3x10  B ER GTB 3x10  TB T's RTB 3x10   Chest Press Machine 70lbs 2 x 10  Bilateral Shoulder Rows 30lbs 3 x 10  Cable Cross ER/IR 10lbs 3 x 10    received the following manual therapy techniques:  for 0 minutes, including:     PATIENT EDUCATION AND HOME EXERCISES     Home Exercises Provided and Patient Education Provided     Education provided:   - cues with exercises     Written Home Exercises Provided: yes. Exercises were reviewed and Román was able to demonstrate them prior to the end of the " session.  Román demonstrated good  understanding of the education provided. See EMR under Patient Instructions for exercises provided during therapy sessions    ASSESSMENT   Patient requires a short rest break, less than 30 seconds, between sets when performing external rotation exercises due to fatigue. Patient completed his therapy with no increase in pain prior to leaving the clinic.  Encouraged pt to continue with HEP. Will progress treatment as tolerated.     Pt prognosis is Good.     Pt will continue to benefit from skilled outpatient physical therapy to address the deficits listed in the problem list box on initial evaluation, provide pt/family education and to maximize pt's level of independence in the home and community environment.     Pt's spiritual, cultural and educational needs considered and pt agreeable to plan of care and goals.     Anticipated barriers to physical therapy: none     Goals:     SHORT TERM GOALS:  4 weeks 4/3/2023   Recent signs and systems trend is improving in order to progress towards LTG's.     Patient will be independent with HEP in order to further progress and return to maximal function.     Pain rating at Worst: 3/10 in order to progress towards increased independence with activity.     Patient will be able to correct postural deviations in sitting and standing, to decrease pain and promote postural awareness for injury prevention.         LONG TERM GOALS: 8 weeks 4/3/2023   Patient will return to normal ADL, recreational, and work related activities with less pain and limitation.      Patient will improve AROM to stated goals in order to return to maximal functional potential.      Patient will improve Strength to stated goals of appropriate musculature in order to improve functional independence.      Pain Rating at Worst: 1/10 to improve Quality of Life.      Patient will meet predicted functional outcome (FOTO) score: 67% or greater to increase self-worth & perceived  functional ability.     Patient will have met personal goal of: returning to strength training.        PLAN   Continue per established plan of care towards established goals.    Mateusz aTtum, PT

## 2023-05-25 ENCOUNTER — CLINICAL SUPPORT (OUTPATIENT)
Dept: REHABILITATION | Facility: HOSPITAL | Age: 38
End: 2023-05-25
Payer: MEDICAID

## 2023-05-25 ENCOUNTER — HOSPITAL ENCOUNTER (OUTPATIENT)
Dept: RADIOLOGY | Facility: HOSPITAL | Age: 38
Discharge: HOME | End: 2023-05-25
Attending: ORTHOPAEDIC SURGERY
Payer: MEDICAID

## 2023-05-25 DIAGNOSIS — M25.512 CHRONIC LEFT SHOULDER PAIN: ICD-10-CM

## 2023-05-25 DIAGNOSIS — G89.29 CHRONIC LEFT SHOULDER PAIN: ICD-10-CM

## 2023-05-25 DIAGNOSIS — R29.898 WEAKNESS OF SHOULDER: ICD-10-CM

## 2023-05-25 DIAGNOSIS — M25.612 DECREASED RANGE OF MOTION OF LEFT SHOULDER: Primary | ICD-10-CM

## 2023-05-25 PROCEDURE — 73221 MRI SHOULDER WITHOUT CONTRAST LEFT: ICD-10-PCS | Mod: 26,LT,, | Performed by: INTERNAL MEDICINE

## 2023-05-25 PROCEDURE — 73221 MRI JOINT UPR EXTREM W/O DYE: CPT | Mod: TC,LT

## 2023-05-25 PROCEDURE — 73221 MRI JOINT UPR EXTREM W/O DYE: CPT | Mod: 26,LT,, | Performed by: INTERNAL MEDICINE

## 2023-05-25 PROCEDURE — 97110 THERAPEUTIC EXERCISES: CPT

## 2023-05-29 ENCOUNTER — CLINICAL SUPPORT (OUTPATIENT)
Dept: REHABILITATION | Facility: HOSPITAL | Age: 38
End: 2023-05-29
Payer: MEDICAID

## 2023-05-29 DIAGNOSIS — R29.898 WEAKNESS OF SHOULDER: ICD-10-CM

## 2023-05-29 DIAGNOSIS — M25.612 DECREASED RANGE OF MOTION OF LEFT SHOULDER: Primary | ICD-10-CM

## 2023-05-29 PROCEDURE — 97110 THERAPEUTIC EXERCISES: CPT

## 2023-05-29 NOTE — PROGRESS NOTES
"[facility]   Physical Therapy Treatment Note     Name: Román Morell  Clinic Number: [id number]    Therapy Diagnosis:   Encounter Diagnoses   Name Primary?    Decreased range of motion of left shoulder Yes    Weakness of shoulder      Physician: Gareth Mercado Jr., *    Visit Date: 5/29/2023    Physician Orders: PT Eval and Treat   Medical Diagnosis from Referral: G54.0 (ICD-10-CM) - Thoracic outlet syndrome  Evaluation Date: 4/3/2023  Authorization Period Expiration: 3/29/2024   Plan of Care Certification Period: 7/3/2023  Visit # / Visits authorized: 7/ 20 + eval     Progress Note Due: 6/3/2023  FOTO: 1/ 3  PTA Visit #: 0/5     Precautions: Standard    Time In: 11:05 AM  Time Out: 11:45 AM  Total Billable Time: 40 minutes    SUBJECTIVE   Pt reports: he continues to have mild shoulder pain with heavy lifting. He would like to get back to working out at the gym.     He was compliant with home exercise program.  Response to previous treatment: no adverse effects  Functional change: in progress    Pain: 0/10  Location: Left shoulder     OBJECTIVE     Objective Measures updated at progress report unless specified.     TREATMENT     Román received the treatments listed below:      received therapeutic exercises to develop strength and ROM for 40 minutes including:    UBE 3' forward / 3' backwards  Doorway pec stretch 3x30"  Serratus wall slides 2x10   Sidelying ER 3x10 - NP today  Prone rows 6# DB 3x10 - NP today  Prone extension 4# DB 3x10 - NP today  B ER GTB 3x10  TB T's RTB 3x10   Chest Press Machine 70lbs 3x12  Bilateral Shoulder Rows 40 lbs 3x12  Cable Cross ER/IR 10lbs 3 x 10 ea   Serratus pull up 2x10  Elevated push up on bench 2x10    received the following manual therapy techniques:  for 0 minutes, including:     PATIENT EDUCATION AND HOME EXERCISES     Home Exercises Provided and Patient Education Provided     Education provided:   - cues with exercises     Written Home " Exercises Provided: yes. Exercises were reviewed and Román was able to demonstrate them prior to the end of the session.  Román demonstrated good  understanding of the education provided. See EMR under Patient Instructions for exercises provided during therapy sessions    ASSESSMENT   Pt tolerated progressions in therex well without c/o shoulder pain. He continues to easily fatigue with ER/IR at cable column. Added serratus pull up and elevated push ups on bench to progress pt towards getting back to the gym. Continue progressing functional strengthening as tolerated.     Pt prognosis is Good.   Pt will continue to benefit from skilled outpatient physical therapy to address the deficits listed in the problem list box on initial evaluation, provide pt/family education and to maximize pt's level of independence in the home and community environment.     Pt's spiritual, cultural and educational needs considered and pt agreeable to plan of care and goals.     Anticipated barriers to physical therapy: none     Goals:     SHORT TERM GOALS:  4 weeks 4/3/2023   Recent signs and systems trend is improving in order to progress towards LTG's.     Patient will be independent with HEP in order to further progress and return to maximal function.     Pain rating at Worst: 3/10 in order to progress towards increased independence with activity.     Patient will be able to correct postural deviations in sitting and standing, to decrease pain and promote postural awareness for injury prevention.         LONG TERM GOALS: 8 weeks 4/3/2023   Patient will return to normal ADL, recreational, and work related activities with less pain and limitation.      Patient will improve AROM to stated goals in order to return to maximal functional potential.      Patient will improve Strength to stated goals of appropriate musculature in order to improve functional independence.      Pain Rating at Worst: 1/10 to improve Quality of Life.       Patient will meet predicted functional outcome (FOTO) score: 67% or greater to increase self-worth & perceived functional ability.     Patient will have met personal goal of: returning to strength training.        PLAN   Continue per established plan of care towards established goals.    Milana Meredith, PT

## 2023-05-29 NOTE — PROGRESS NOTES
"[facility]   Physical Therapy Treatment Note     Name: Román Morell  Clinic Number: [id number]    Therapy Diagnosis:   Encounter Diagnoses   Name Primary?    Decreased range of motion of left shoulder Yes    Weakness of shoulder        Physician: Gareth Mercado Jr., *    Visit Date: 5/25/2023    Physician Orders: PT Eval and Treat   Medical Diagnosis from Referral: G54.0 (ICD-10-CM) - Thoracic outlet syndrome  Evaluation Date: 4/3/2023  Authorization Period Expiration: 3/29/2024   Plan of Care Certification Period: 7/3/2023  Visit # / Visits authorized: 4/ 20 + eval     Progress Note Due: 5/3/2023  FOTO: 1/ 1  PTA Visit #: 2/5     Precautions: Standard    Time In: 12:15 AM  Time Out: 1:00 AM  Total Billable Time: 45 minutes    SUBJECTIVE   Pt reports: having no pain. It only bothers his when he lifts anything heavy.    He was compliant with home exercise program.  Response to previous treatment: no adverse effects  Functional change: none yet    Pain: 0/10  Location: Left shoulder     OBJECTIVE     Objective Measures updated at progress report unless specified.     TREATMENT     Román received the treatments listed below:      received therapeutic exercises to develop strength and ROM for 45 minutes including:    UBE 3' forward / 3' backwards  Doorway pec stretch 3x30"  Serratus wall slides 2x10   Scaption AAROM c/ dowel 3x10  Sidelying ER 3x10  Prone rows 6# DB 3x10  Prone extension 4# DB 3x10  B ER GTB 3x10  TB T's RTB 3x10   Chest Press Machine 70lbs 2 x 10  Bilateral Shoulder Rows 30lbs 3 x 10  Cable Cross ER/IR 10lbs 3 x 10    received the following manual therapy techniques:  for 0 minutes, including:     PATIENT EDUCATION AND HOME EXERCISES     Home Exercises Provided and Patient Education Provided     Education provided:   - cues with exercises     Written Home Exercises Provided: yes. Exercises were reviewed and Román was able to demonstrate them prior to the end of the " session.  Román demonstrated good  understanding of the education provided. See EMR under Patient Instructions for exercises provided during therapy sessions    ASSESSMENT   Patient requires a short rest break, less than 30 seconds, between sets when performing external rotation exercises due to fatigue. Patient completed his therapy with no increase in pain prior to leaving the clinic.  Encouraged pt to continue with HEP. Will progress treatment as tolerated.     Pt prognosis is Good.     Pt will continue to benefit from skilled outpatient physical therapy to address the deficits listed in the problem list box on initial evaluation, provide pt/family education and to maximize pt's level of independence in the home and community environment.     Pt's spiritual, cultural and educational needs considered and pt agreeable to plan of care and goals.     Anticipated barriers to physical therapy: none     Goals:     SHORT TERM GOALS:  4 weeks 4/3/2023   Recent signs and systems trend is improving in order to progress towards LTG's.     Patient will be independent with HEP in order to further progress and return to maximal function.     Pain rating at Worst: 3/10 in order to progress towards increased independence with activity.     Patient will be able to correct postural deviations in sitting and standing, to decrease pain and promote postural awareness for injury prevention.         LONG TERM GOALS: 8 weeks 4/3/2023   Patient will return to normal ADL, recreational, and work related activities with less pain and limitation.      Patient will improve AROM to stated goals in order to return to maximal functional potential.      Patient will improve Strength to stated goals of appropriate musculature in order to improve functional independence.      Pain Rating at Worst: 1/10 to improve Quality of Life.      Patient will meet predicted functional outcome (FOTO) score: 67% or greater to increase self-worth & perceived  functional ability.     Patient will have met personal goal of: returning to strength training.        PLAN   Continue per established plan of care towards established goals.    Mateusz Tatum, PT

## 2023-06-01 ENCOUNTER — OFFICE VISIT (OUTPATIENT)
Dept: ORTHOPEDICS | Facility: CLINIC | Age: 38
End: 2023-06-01
Payer: MEDICAID

## 2023-06-01 DIAGNOSIS — R29.898 WEAKNESS OF SHOULDER: Primary | ICD-10-CM

## 2023-06-01 PROCEDURE — 99213 OFFICE O/P EST LOW 20 MIN: CPT | Mod: 95,,, | Performed by: ORTHOPAEDIC SURGERY

## 2023-06-01 PROCEDURE — 99213 PR OFFICE/OUTPT VISIT, EST, LEVL III, 20-29 MIN: ICD-10-PCS | Mod: 95,,, | Performed by: ORTHOPAEDIC SURGERY

## 2023-06-01 NOTE — PROGRESS NOTES
Established Patient - Audio Only Telehealth Visit     The patient location is:  At home  The chief complaint leading to consultation is:  Left shoulder pain  Visit type: Virtual visit with audio only (telephone)  Total time spent with patient:  12 minutes       The reason for the audio only service rather than synchronous audio and video virtual visit was related to technical difficulties or patient preference/necessity.     Each patient to whom I provide medical services by telemedicine is:  (1) informed of the relationship between the physician and patient and the respective role of any other health care provider with respect to management of the patient; and (2) notified that they may decline to receive medical services by telemedicine and may withdraw from such care at any time. Patient verbally consented to receive this service via voice-only telephone call.       HPI:  37-year-old male with left shoulder pain        Assessment and plan:  Recent MRI scan of the left shoulder shows some evidence of tendinopathy and AC joint arthritis but no evidence of rotator cuff tear or labral tear   I went over that with the patient today   I recommended that he continue with exercises and anti-inflammatory medication he is currently in therapy   We might consider injection next visit   Follow-up 3-4 weeks                        This service was not originating from a related E/M service provided within the previous 7 days nor will  to an E/M service or procedure within the next 24 hours or my soonest available appointment.  Prevailing standard of care was able to be met in this audio-only visit.

## 2023-06-05 ENCOUNTER — CLINICAL SUPPORT (OUTPATIENT)
Dept: REHABILITATION | Facility: HOSPITAL | Age: 38
End: 2023-06-05
Payer: MEDICAID

## 2023-06-05 DIAGNOSIS — M25.612 DECREASED RANGE OF MOTION OF LEFT SHOULDER: Primary | ICD-10-CM

## 2023-06-05 DIAGNOSIS — R29.898 WEAKNESS OF SHOULDER: ICD-10-CM

## 2023-06-05 PROCEDURE — 97110 THERAPEUTIC EXERCISES: CPT

## 2023-06-05 NOTE — PROGRESS NOTES
"[facility]   Physical Therapy Treatment Note     Name: Román Villagran  [facility] Number: [id number]    Therapy Diagnosis:   Encounter Diagnoses   Name Primary?    Decreased range of motion of left shoulder Yes    Weakness of shoulder      Physician: Gareth Mercado Jr., *    Visit Date: 6/5/2023    Physician Orders: PT Eval and Treat   Medical Diagnosis from Referral: G54.0 (ICD-10-CM) - Thoracic outlet syndrome  Evaluation Date: 4/3/2023  Authorization Period Expiration: 3/29/2024   Plan of Care Certification Period: 7/3/2023  Visit # / Visits authorized: 9/ 20 + eval     Progress Note Due: 7/3/2023  FOTO: 2/ 3  PTA Visit #: 0/5     Precautions: Standard    Time In: 11:10 AM  Time Out: 11:50 AM  Total Billable Time: 40 minutes    SUBJECTIVE   Pt reports: he feels that his ROM has improved, but still feeling pain with certain activities such as heavy lifting and when turning the steering wheel.     He was compliant with home exercise program.  Response to previous treatment: no adverse effects  Functional change: improved overhead reaching     Pain: 0/10  Location: Left shoulder     OBJECTIVE     6/5/2023    Active Range of Motion (Passive Range of Motion) : Measured in degrees  Shoulder Left Right Goal   Flexion 148 (WNL)    Abduction 160 (WNL)    Functional ER AROM (ER at 90 abd) T3 (WNL) T5  90   Functional IR (IR at 90 abd) L3 (WNL) T10 80      Upper Extremity Strength  Elbow Left Right Goals   Biceps 5/5 5/5 5/5   Triceps 5/5 5/5 5/5      Shoulder Left Right Goals   Shoulder flexion: 5/5 5/5 5/5   Shoulder Abduction: 4+/5 5/5 5/5   Shoulder ER 4+/5 5/5 5/5   Shoulder IR 4+/5 5/5 5/5     FOTO Initial: 45% limitation  FOTO 6/5/23: 28% limitation       TREATMENT     Román received the treatments listed below:      received therapeutic exercises to develop strength and ROM for 40 minutes including:    UBE 3' forward / 3' backwards  Doorway pec stretch 3x30"  Standing IR " (behind the back) stretch c/ towel 10 sec hold x10   Sidelying ER 3x10   B ER TB 3x10  TB T's RTB 3x10   Chest Press Machine 70lbs 3x12  Bilateral Shoulder Rows 40 lbs 3x12  Cable Cross ER/IR 10lbs 3 x 10 ea   Scapula pull up 2x10  Elevated push up on bench x10    received the following manual therapy techniques:  for 0 minutes, including:     PATIENT EDUCATION AND HOME EXERCISES     Home Exercises Provided and Patient Education Provided     Education provided:   - cues with exercises     Written Home Exercises Provided: yes. Exercises were reviewed and Román was able to demonstrate them prior to the end of the session.  Román demonstrated good  understanding of the education provided. See EMR under Patient Instructions for exercises provided during therapy sessions    ASSESSMENT   Reassessment performed today and pt demonstrates improvement in L shoulder AROM and strength with mild deficits remaining. He reports improvement in overall functional mobility, but continues to have mild pain with heavy lifting. He continues to tolerate therex well with mild muscular fatigue at end of session. He will continue to benefit from from skilled outpatient physical therapy to address remaining impairments and further progress towards therapy goals.     Pt prognosis is Good.   Pt's spiritual, cultural and educational needs considered and pt agreeable to plan of care and goals.     Anticipated barriers to physical therapy: none     Goals:     SHORT TERM GOALS:  4 weeks 4/3/2023   Recent signs and systems trend is improving in order to progress towards LTG's. MET    Patient will be independent with HEP in order to further progress and return to maximal function.  Progressing   Pain rating at Worst: 3/10 in order to progress towards increased independence with activity.  MET      LONG TERM GOALS: 8 weeks 4/3/2023   Patient will return to normal ADL, recreational, and work related activities with less pain and limitation.   Progressing    Patient will improve AROM to stated goals in order to return to maximal functional potential.  Progressing    Patient will improve Strength to stated goals of appropriate musculature in order to improve functional independence.  Progressing    Pain Rating at Worst: 1/10 to improve Quality of Life.   Progressing   Patient will meet predicted functional outcome (FOTO) score: 67% or greater to increase self-worth & perceived functional ability.     Patient will have met personal goal of: returning to strength training. Progressing       PLAN   Continue per established plan of care towards established goals.    Milana Meredith, PT

## 2023-06-08 ENCOUNTER — CLINICAL SUPPORT (OUTPATIENT)
Dept: REHABILITATION | Facility: HOSPITAL | Age: 38
End: 2023-06-08
Payer: MEDICAID

## 2023-06-08 DIAGNOSIS — R29.898 WEAKNESS OF SHOULDER: ICD-10-CM

## 2023-06-08 DIAGNOSIS — M25.612 DECREASED RANGE OF MOTION OF LEFT SHOULDER: Primary | ICD-10-CM

## 2023-06-08 PROCEDURE — 97110 THERAPEUTIC EXERCISES: CPT

## 2023-06-08 NOTE — PROGRESS NOTES
"[facility]   Physical Therapy Treatment Note     Name: Román ChamberlainHammond  Clinic Number: [id number]    Therapy Diagnosis:   No diagnosis found.    Physician: Gareth Mercado Jr., *    Visit Date: 6/8/2023    Physician Orders: PT Eval and Treat   Medical Diagnosis from Referral: G54.0 (ICD-10-CM) - Thoracic outlet syndrome  Evaluation Date: 4/3/2023  Authorization Period Expiration: 3/29/2024   Plan of Care Certification Period: 7/3/2023  Visit # / Visits authorized: 10/ 20 + eval     Progress Note Due: 7/3/2023  FOTO: 2/ 3  PTA Visit #: 0/5     Precautions: Standard    Time In: 12:05 AM  Time Out: 12:50 AM  Total Billable Time: 45 minutes    SUBJECTIVE   Pt reports: that his tolerance at work has increased and is noticing improvements in range of motion. Pt reports he still is having pain at times when driving and lifting things overhead.    He was compliant with home exercise program.  Response to previous treatment: no adverse effects  Functional change: improved overhead reaching     Pain: 0/10  Location: Left shoulder     OBJECTIVE     Objective measures updated at progress report unless otherwise specified.    TREATMENT     Román received the treatments listed below:      received therapeutic exercises to develop strength and ROM for 45 minutes including:    UBE 2' forward / 2' backwards  Doorway pec stretch 3x30"  Seated Lat Dowel Stretch 3x30s  Prone ER 90/90 10 no wt, 2x10 1#  90/90 walkouts ER Gr TB 3x10  90/90 walkouts IR Gr TB 3x10  Wall Slide with lift off 2x10    NT  Standing IR (behind the back) stretch c/ towel 10 sec hold x10   Sidelying ER 3x10   B ER TB 3x10  TB T's RTB 3x10   Chest Press Machine 70lbs 3x12  Bilateral Shoulder Rows 40 lbs 3x12  Cable Cross ER/IR 10lbs 3 x 10 ea   Scapula pull up 2x10  Elevated push up on bench x10    received the following manual therapy techniques: for 0 minutes, including:     PATIENT EDUCATION AND HOME EXERCISES     Home Exercises " Provided and Patient Education Provided     Education provided:   - HEP additions 90/90 walkouts    Written Home Exercises Provided: yes. Exercises were reviewed and Román was able to demonstrate them prior to the end of the session.  Román demonstrated good  understanding of the education provided. See EMR under Patient Instructions for exercises provided during therapy sessions    ASSESSMENT   Román continues to report improvements in overall functional mobility. Patient demonstrates good external rotation strength below 90, but presents with deficits above 90 greater on left than right. Patient progressed with above 90 external rotation work and responded well.    He continues to tolerate therex well with mild muscular fatigue at end of session. He will continue to benefit from from skilled outpatient physical therapy to address remaining impairments and further progress towards therapy goals.     Pt prognosis is Good.   Pt's spiritual, cultural and educational needs considered and pt agreeable to plan of care and goals.     Anticipated barriers to physical therapy: none     Goals:     SHORT TERM GOALS:  4 weeks 4/3/2023   Recent signs and systems trend is improving in order to progress towards LTG's. MET    Patient will be independent with HEP in order to further progress and return to maximal function.  Progressing   Pain rating at Worst: 3/10 in order to progress towards increased independence with activity.  MET      LONG TERM GOALS: 8 weeks 4/3/2023   Patient will return to normal ADL, recreational, and work related activities with less pain and limitation.  Progressing    Patient will improve AROM to stated goals in order to return to maximal functional potential.  Progressing    Patient will improve Strength to stated goals of appropriate musculature in order to improve functional independence.  Progressing    Pain Rating at Worst: 1/10 to improve Quality of Life.   Progressing   Patient will meet  predicted functional outcome (FOTO) score: 67% or greater to increase self-worth & perceived functional ability.     Patient will have met personal goal of: returning to strength training. Progressing       PLAN   Continue per established plan of care towards established goals.    Rigoberto Armenta, PT

## 2023-06-13 ENCOUNTER — DOCUMENTATION ONLY (OUTPATIENT)
Dept: REHABILITATION | Facility: HOSPITAL | Age: 38
End: 2023-06-13

## 2023-06-13 NOTE — PROGRESS NOTES
PT called patient after patient had not arrived for 20 minutes after visit was scheduled to begin.   Communication: Phone call was made and voicemail option was unavailable due to full inbox.    Patient Confirmed Next Appointment: No  Number of No-Shows To Date: 4  Number of Same-Day Cancels To Date: 1  Notes: If patient no-shows again they will be removed from schedule    Rigoberto Armenta PT

## 2023-06-15 ENCOUNTER — CLINICAL SUPPORT (OUTPATIENT)
Dept: REHABILITATION | Facility: HOSPITAL | Age: 38
End: 2023-06-15
Payer: MEDICAID

## 2023-06-15 DIAGNOSIS — R29.898 WEAKNESS OF SHOULDER: ICD-10-CM

## 2023-06-15 DIAGNOSIS — M25.612 DECREASED RANGE OF MOTION OF LEFT SHOULDER: Primary | ICD-10-CM

## 2023-06-15 PROCEDURE — 97110 THERAPEUTIC EXERCISES: CPT

## 2023-06-15 NOTE — PROGRESS NOTES
"[facility]   Physical Therapy Treatment Note     [id number]    Therapy Diagnosis:   Encounter Diagnoses   Name Primary?    Decreased range of motion of left shoulder Yes    Weakness of shoulder        Physician: Gareth Mercado Jr., *    Visit Date: 6/15/2023    Physician Orders: PT Eval and Treat   Medical Diagnosis from Referral: G54.0 (ICD-10-CM) - Thoracic outlet syndrome  Evaluation Date: 4/3/2023  Authorization Period Expiration: 3/29/2024   Plan of Care Certification Period: 7/3/2023  Visit # / Visits authorized: 11/ 20 + eval     Progress Note Due: 7/3/2023  FOTO: 2/ 3  PTA Visit #: 0/5     Precautions: Standard    Time In: 12:30 pm  Time Out: 1:00 pm  Total Billable Time: 30 minutes    SUBJECTIVE   Pt reports: that his tolerance at work has increased and is noticing improvements in range of motion. Pt reports his shoulder as been bothering him lately when putting load in his arms going overhead.    He was compliant with home exercise program.  Response to previous treatment: no adverse effects  Functional change: improved overhead reaching     Pain: 0/10  Location: Left shoulder     OBJECTIVE     Objective measures updated at progress report unless otherwise specified.    TREATMENT     Román received the treatments listed below:      received therapeutic exercises to develop strength and ROM for 25 minutes including:    SL Sleepers Stretch 5x30s  St. Scaption 2# with scap retract 2x10  90/90 farmers walk 10# 3 laps  Education Provided See Below    NT  UBE 2' forward / 2' backwards  Doorway pec stretch 3x30"  Seated Lat Dowel Stretch 3x30s  Prone ER 90/90 10 no wt, 2x10 1#  90/90 walkouts ER Gr TB 3x10  90/90 walkouts IR Gr TB 3x10  Wall Slide with lift off 2x10    Standing IR (behind the back) stretch c/ towel 10 sec hold x10   Sidelying ER 3x10   B ER TB 3x10  TB T's RTB 3x10   Chest Press Machine 70lbs 3x12  Bilateral Shoulder Rows 40 lbs " 3x12  Cable Cross ER/IR 10lbs 3 x 10 ea   Scapula pull up 2x10  Elevated push up on bench x10    received the following manual therapy techniques: for 5 minutes, including:     GHJ posterior glide grade 3-4    PATIENT EDUCATION AND HOME EXERCISES     Home Exercises Provided and Patient Education Provided     Education provided:   - HEP  - safe shoulder position during gym movements  - safe progressive overload principles for shoulder    Written Home Exercises Provided: yes. Exercises were reviewed and Román was able to demonstrate them prior to the end of the session.  Román demonstrated good  understanding of the education provided. See EMR under Patient Instructions for exercises provided during therapy sessions    ASSESSMENT   Edvonda presenting with minimal limitations with overhead motions; however, is still complaining of pain in shoulder following lifting load at work as a . Patient tolerated progressions today for load to shoulder. Discussed with patient on introducing gym routine back into his exercise regimen with proper form and how to progressively load shoulder complex safely.    Plan to see how patient tolerates getting back in the gym next session and plan to possibly discharge if patient reports no significant shoulder issues.    He continues to tolerate therex well with mild muscular fatigue at end of session. He will continue to benefit from from skilled outpatient physical therapy to address remaining impairments and further progress towards therapy goals.     Pt prognosis is Good.   Pt's spiritual, cultural and educational needs considered and pt agreeable to plan of care and goals.     Anticipated barriers to physical therapy: none     Goals:     SHORT TERM GOALS:  4 weeks 4/3/2023   Recent signs and systems trend is improving in order to progress towards LTG's. MET    Patient will be independent with HEP in order to further progress and return to maximal function.  Progressing   Pain  rating at Worst: 3/10 in order to progress towards increased independence with activity.  MET      LONG TERM GOALS: 8 weeks 4/3/2023   Patient will return to normal ADL, recreational, and work related activities with less pain and limitation.  Progressing    Patient will improve AROM to stated goals in order to return to maximal functional potential.  Progressing    Patient will improve Strength to stated goals of appropriate musculature in order to improve functional independence.  Progressing    Pain Rating at Worst: 1/10 to improve Quality of Life.   Progressing   Patient will meet predicted functional outcome (FOTO) score: 67% or greater to increase self-worth & perceived functional ability.     Patient will have met personal goal of: returning to strength training. Progressing       PLAN   Continue per established plan of care towards established goals.    Rigoberto Armenta, PT

## 2023-06-20 ENCOUNTER — CLINICAL SUPPORT (OUTPATIENT)
Dept: REHABILITATION | Facility: HOSPITAL | Age: 38
End: 2023-06-20
Payer: MEDICAID

## 2023-06-20 DIAGNOSIS — M25.612 DECREASED RANGE OF MOTION OF LEFT SHOULDER: Primary | ICD-10-CM

## 2023-06-20 DIAGNOSIS — R29.898 WEAKNESS OF SHOULDER: ICD-10-CM

## 2023-06-20 PROCEDURE — 97110 THERAPEUTIC EXERCISES: CPT

## 2023-06-20 NOTE — PROGRESS NOTES
[facility]   Physical Therapy Treatment Note     Name: Román Villagran  [facility] Number: [id number]    Therapy Diagnosis:   Encounter Diagnoses   Name Primary?    Decreased range of motion of left shoulder Yes    Weakness of shoulder        Physician: Gareth Mercado Jr., *    Visit Date: 6/20/2023    Physician Orders: PT Eval and Treat   Medical Diagnosis from Referral: G54.0 (ICD-10-CM) - Thoracic outlet syndrome  Evaluation Date: 4/3/2023  Authorization Period Expiration: 3/29/2024   Plan of Care Certification Period: 7/3/2023  Visit # / Visits authorized: 12/20 + eval     Progress Note Due: 7/3/2023  FOTO: 2/ 3  PTA Visit #: 0/5     Precautions: Standard    Time In: 1:10 pm  Time Out: 1:50 pm  Total Billable Time: 40 minutes    SUBJECTIVE   Pt reports: that his tolerance at work has increased and is noticing improvements in range of motion. Pt reports his shoulder as been bothering him lately when putting load in his arms going overhead.    He was compliant with home exercise program.  Response to previous treatment: no adverse effects  Functional change: improved overhead reaching     Pain: 0/10  Location: Left shoulder     OBJECTIVE     6/20/2023  Active Range of Motion:   Shoulder Right Left   Flexion 180 175   Abduction 180 175   ER at 0 WNL WNL   IR - HBB WNL WNL     Strength:  Shoulder Right Left Pain/Dysfunction   Supraspinatus Full Can 4+/5 4/5    ER 4+/5 4/5    ER @ 90 4+/5 4-/5    IR 5/5 5/5    Low Trap 4/5 3/5      Joint Mobility:   AP - hypomobile  Inferior - hypomobile    Special Tests:  Impingement:   Right Left   Hawkin's Kenndy - -   Neer's Test - -   Misti's Test - -       TREATMENT     Román received the treatments listed below:      received therapeutic exercises to develop strength and ROM for 35 minutes including:    Prone ER 90/90 3x10 2#  Prone Level 2 Lower Trap Raise 3x10 3 sec hold  Prone W Superman 5x5  90/90 ER Cable Shoulder Press 10lb left 3x5  Wall Slides  "+ lift off 30x  Mat Serratus Pushes with overhead lean 20x    Education Provided See Below    NT  UBE 2' forward / 2' backwards  Doorway pec stretch 3x30"  Seated Lat Dowel Stretch 3x30s  90/90 walkouts ER Gr TB 3x10  90/90 walkouts IR Gr TB 3x10  Wall Slide with lift off 2x10  Standing IR (behind the back) stretch c/ towel 10 sec hold x10   Sidelying ER 3x10   B ER TB 3x10  TB T's RTB 3x10   Chest Press Machine 70lbs 3x12  Bilateral Shoulder Rows 40 lbs 3x12  Cable Cross ER/IR 10lbs 3 x 10 ea   Scapula pull up 2x10  SL Sleepers Stretch 5x30s  St. Scaption 2# with scap retract 2x10  90/90 farmers walk 10# 3 laps    received the following manual therapy techniques: for 5 minutes, including:     GHJ posterior glide grade 3-4  GHJ inferior glide grade 3-4    PATIENT EDUCATION AND HOME EXERCISES     Home Exercises Provided and Patient Education Provided     Education provided:   - HEP  - safe shoulder position during gym movements  - safe progressive overload principles for shoulder    Written Home Exercises Provided: yes. Exercises were reviewed and Román was able to demonstrate them prior to the end of the session.  Román demonstrated good  understanding of the education provided. See EMR under Patient Instructions for exercises provided during therapy sessions    ASSESSMENT   Román presenting with minimal limitations with overhead motions; however, is still complaining of pain in shoulder following lifting load at work as a . Pt demonstrated good shoulder range of motion, but is lacking strength of left lower trap and external rotation strength at 90 degrees contributing to onset of symptoms after prolonged overhead work. Home exercise program updated to address strength deficits.    Plan to see how patient tolerates new exercises next session and plan to possibly discharge if patient reports no significant shoulder issues.    He continues to tolerate therex well with mild muscular fatigue at end of " session. He will continue to benefit from from skilled outpatient physical therapy to address remaining impairments and further progress towards therapy goals.     Pt prognosis is Good.   Pt's spiritual, cultural and educational needs considered and pt agreeable to plan of care and goals.     Anticipated barriers to physical therapy: none     Goals:     SHORT TERM GOALS:  4 weeks 4/3/2023   Recent signs and systems trend is improving in order to progress towards LTG's. MET    Patient will be independent with HEP in order to further progress and return to maximal function.  Progressing   Pain rating at Worst: 3/10 in order to progress towards increased independence with activity.  MET      LONG TERM GOALS: 8 weeks 4/3/2023   Patient will return to normal ADL, recreational, and work related activities with less pain and limitation.  Progressing    Patient will improve AROM to stated goals in order to return to maximal functional potential.  Progressing    Patient will improve Strength to stated goals of appropriate musculature in order to improve functional independence.  Progressing    Pain Rating at Worst: 1/10 to improve Quality of Life.   Progressing   Patient will meet predicted functional outcome (FOTO) score: 67% or greater to increase self-worth & perceived functional ability.     Patient will have met personal goal of: returning to strength training. Progressing       PLAN   Continue per established plan of care towards established goals.    Rigoberto Armenta, PT

## 2023-06-23 ENCOUNTER — CLINICAL SUPPORT (OUTPATIENT)
Dept: REHABILITATION | Facility: HOSPITAL | Age: 38
End: 2023-06-23
Payer: MEDICAID

## 2023-06-23 DIAGNOSIS — R29.898 WEAKNESS OF SHOULDER: ICD-10-CM

## 2023-06-23 DIAGNOSIS — M25.612 DECREASED RANGE OF MOTION OF LEFT SHOULDER: Primary | ICD-10-CM

## 2023-06-23 PROCEDURE — 97110 THERAPEUTIC EXERCISES: CPT

## 2023-06-23 NOTE — PROGRESS NOTES
[facility]   Physical Therapy Treatment Note     Name: Román Morell  Clinic Number: [id number]    Therapy Diagnosis:   Encounter Diagnoses   Name Primary?    Decreased range of motion of left shoulder Yes    Weakness of shoulder        Physician: Gareth Mercado Jr., *    Visit Date: 6/23/2023    Physician Orders: PT Eval and Treat   Medical Diagnosis from Referral: G54.0 (ICD-10-CM) - Thoracic outlet syndrome  Evaluation Date: 4/3/2023  Authorization Period Expiration: 3/29/2024   Plan of Care Certification Period: 7/3/2023  Visit # / Visits authorized: 13/20 + eval     Progress Note Due: 7/3/2023  FOTO: 2/ 3  PTA Visit #: 0/5     Precautions: Standard    Time In: 12:15 pm  Time Out: 12:55 pm  Total Billable Time: 40 minutes    SUBJECTIVE   Pt reports: that his tolerance at work has increased and is noticing improvements in range of motion. Patient reports he does not have issues with overhead work anymore. Patient reports he still is experiencing pain in the from of his shoulder when lifting at side.    He was compliant with home exercise program.  Response to previous treatment: no adverse effects  Functional change: improved overhead reaching     Pain: 0/10  Location: Left shoulder     OBJECTIVE     6/20/2023  Active Range of Motion:   Shoulder Right Left   Flexion 180 175   Abduction 180 175   ER at 0 WNL WNL   IR - HBB WNL WNL     Strength:  Shoulder Right Left Pain/Dysfunction   Supraspinatus Full Can 4+/5 4/5    ER 4+/5 4/5    ER @ 90 4+/5 4-/5    IR 5/5 5/5    Low Trap 4/5 3/5      Joint Mobility:   AP - hypomobile  Inferior - hypomobile    Special Tests:  Impingement:   Right Left   Hawkin's Kenndy - -   Neer's Test - -   Misti's Test - -       TREATMENT     Román received the treatments listed below:      received therapeutic exercises to develop strength and ROM for 35 minutes including:    Doorway Pec Stretch with left foot fwd 3x30s  Wall Aleks 30x  Prone ER 90/90 3x10  "3#  Prone W Superman 5x5  Standing Shoulder Rows underhand 40# 3x10  Standing Bicep Curl Cable 10# walkout hold - cue for proper shoulder position 4x10  Education Provided See Below    NT  Prone Level 2 Lower Trap Raise 3x10 3 sec hold  90/90 ER Cable Shoulder Press 10lb left 3x5  Wall Slides + lift off 30x  Mat Serratus Pushes with overhead lean 20x  UBE 2' forward / 2' backwards  Doorway pec stretch 3x30"  Seated Lat Dowel Stretch 3x30s  90/90 walkouts ER Gr TB 3x10  90/90 walkouts IR Gr TB 3x10  Wall Slide with lift off 2x10  Standing IR (behind the back) stretch c/ towel 10 sec hold x10   Sidelying ER 3x10   B ER TB 3x10  TB T's RTB 3x10   Chest Press Machine 70lbs 3x12  Bilateral Shoulder Rows 40 lbs 3x12  Cable Cross ER/IR 10lbs 3 x 10 ea   Scapula pull up 2x10  SL Sleepers Stretch 5x30s  St. Scaption 2# with scap retract 2x10  90/90 farmers walk 10# 3 laps    received the following manual therapy techniques: for 5 minutes, including:     GHJ posterior glide grade 3-4  GHJ inferior glide grade 3-4    PATIENT EDUCATION AND HOME EXERCISES     Home Exercises Provided and Patient Education Provided     Education provided:   - HEP  - safe shoulder position during gym movements (addition of bicep curls)  - safe progressive overload principles for shoulder    Written Home Exercises Provided: yes. Exercises were reviewed and Román was able to demonstrate them prior to the end of the session.  Román demonstrated good  understanding of the education provided. See EMR under Patient Instructions for exercises provided during therapy sessions    ASSESSMENT   Edward presenting with no limitations with overhead motions; however, is still complaining of pain in shoulder following lifting load at work as a . Patient demonstrating bicep tendon irritation at origin that feels inflamed upon palpation and presents pain with biceps manual muscle test. Patient responded well to focusing on scapular stabilization with " biceps curl to avoid anterior glenohumeral joint pressure on tendon. Patient given bicep curls with discussion on progressive overload of tendon complex.    Plan to see how patient tolerates new exercises next session and plan to possibly discharge if patient reports no significant shoulder issues.    He continues to tolerate therex well with mild muscular fatigue at end of session. He will continue to benefit from from skilled outpatient physical therapy to address remaining impairments and further progress towards therapy goals.     Pt prognosis is Good.   Pt's spiritual, cultural and educational needs considered and pt agreeable to plan of care and goals.     Anticipated barriers to physical therapy: none     Goals:     SHORT TERM GOALS:  4 weeks 4/3/2023   Recent signs and systems trend is improving in order to progress towards LTG's. MET    Patient will be independent with HEP in order to further progress and return to maximal function.  Progressing   Pain rating at Worst: 3/10 in order to progress towards increased independence with activity.  MET      LONG TERM GOALS: 8 weeks 4/3/2023   Patient will return to normal ADL, recreational, and work related activities with less pain and limitation.  Progressing    Patient will improve AROM to stated goals in order to return to maximal functional potential.  Progressing    Patient will improve Strength to stated goals of appropriate musculature in order to improve functional independence.  Progressing    Pain Rating at Worst: 1/10 to improve Quality of Life.   Progressing   Patient will meet predicted functional outcome (FOTO) score: 67% or greater to increase self-worth & perceived functional ability.     Patient will have met personal goal of: returning to strength training. Progressing       PLAN   Continue per established plan of care towards established goals.    Rigoberto Armenta, PT

## 2023-06-27 ENCOUNTER — CLINICAL SUPPORT (OUTPATIENT)
Dept: REHABILITATION | Facility: HOSPITAL | Age: 38
End: 2023-06-27
Payer: MEDICAID

## 2023-06-27 DIAGNOSIS — R29.898 WEAKNESS OF SHOULDER: ICD-10-CM

## 2023-06-27 DIAGNOSIS — M25.612 DECREASED RANGE OF MOTION OF LEFT SHOULDER: Primary | ICD-10-CM

## 2023-06-27 PROCEDURE — 97110 THERAPEUTIC EXERCISES: CPT

## 2023-06-27 NOTE — PROGRESS NOTES
[facility]   Physical Therapy Discharge Note     Name: Román Morell  Clinic Number: [id number]    Therapy Diagnosis:   Encounter Diagnoses   Name Primary?    Decreased range of motion of left shoulder Yes    Weakness of shoulder        Physician: Gareth Mercado Jr., *    Visit Date: 6/27/2023    Physician Orders: PT Eval and Treat   Medical Diagnosis from Referral: G54.0 (ICD-10-CM) - Thoracic outlet syndrome  Evaluation Date: 4/3/2023  Authorization Period Expiration: 3/29/2024   Plan of Care Certification Period: 7/3/2023  Visit # / Visits authorized: 14/20 + eval     Progress Note Due: 7/3/2023  FOTO: 3/ 3  PTA Visit #: 0/5     Precautions: Standard    Time In: 11:30 am  Time Out: 12:10 pm  Total Billable Time: 40 minutes    SUBJECTIVE   Pt reports: that his tolerance at work has increased and is noticing improvements in range of motion. Patient reports he does not have issues with overhead work anymore. Patient feels good to make today his last day of therapy and plans to begin a gym routine to further strengthen.    He was compliant with home exercise program.  Response to previous treatment: no adverse effects  Functional change: improved overhead reaching     Pain: 0/10  Location: Left shoulder     OBJECTIVE     6/20/2023  Active Range of Motion:   Shoulder Right Left   Flexion 180 175   Abduction 180 175   ER at 0 WNL WNL   IR - HBB WNL WNL     Strength:  Shoulder Right Left Pain/Dysfunction   Supraspinatus Full Can 4+/5 4/5    ER 4+/5 4/5    ER @ 90 4+/5 4-/5    IR 5/5 5/5    Low Trap 4/5 3/5      Joint Mobility:   AP - hypomobile  Inferior - hypomobile    Special Tests:  Impingement:   Right Left   Hawkin's Kenndy - -   Neer's Test - -   Misti's Test - -       TREATMENT     Román received the treatments listed below:      received therapeutic exercises to develop strength and ROM for 40 minutes including:    Doorway Pec Stretch with left foot fwd 3x30s  Wall Slides + lift  off 30x  Supine Dumbbell Bench Press 8lbs 3x10  Seated Shoulder Row Machine 80# 3x10  Standing dumbbell row 8lb 3x10  Prone W superman 20x    Education provided: see below    received the following manual therapy techniques: for 00 minutes, including:     GHJ posterior glide grade 3-4  GHJ inferior glide grade 3-4    PATIENT EDUCATION AND HOME EXERCISES     Home Exercises Provided and Patient Education Provided     Education provided:   - HEP  - safe shoulder position during gym movements  - safe progressive overload principles for shoulder    Written Home Exercises Provided: yes. Exercises were reviewed and Román was able to demonstrate them prior to the end of the session.  Román demonstrated good  understanding of the education provided. See EMR under Patient Instructions for exercises provided during therapy sessions    ASSESSMENT   Patient appropriate for D/C at this time to HEP. Patient educated on introductory gym routine and continuing strengthening on his own. Patient has met all long term and short term goals. Educated on reaching out if symptoms change or get worse.     Pt prognosis is Good.   Pt's spiritual, cultural and educational needs considered and pt agreeable to plan of care and goals.     Anticipated barriers to physical therapy: none     Goals:     SHORT TERM GOALS:  4 weeks 6/27/2023   Recent signs and systems trend is improving in order to progress towards LTG's. MET    Patient will be independent with HEP in order to further progress and return to maximal function.  MET   Pain rating at Worst: 3/10 in order to progress towards increased independence with activity.  MET      LONG TERM GOALS: 8 weeks 6/27/2023   Patient will return to normal ADL, recreational, and work related activities with less pain and limitation.  MET   Patient will improve AROM to stated goals in order to return to maximal functional potential.  MET    Patient will improve Strength to stated goals of appropriate  musculature in order to improve functional independence.  MET    Pain Rating at Worst: 1/10 to improve Quality of Life.   MET   Patient will meet predicted functional outcome (FOTO) score: 67% or greater to increase self-worth & perceived functional ability.  MET   Patient will have met personal goal of: returning to strength training. MET       PLAN     Patient discharged from Physical Therapy today.     Rigoberto Armenta, PT